# Patient Record
Sex: FEMALE | Race: WHITE | NOT HISPANIC OR LATINO | Employment: UNEMPLOYED | URBAN - METROPOLITAN AREA
[De-identification: names, ages, dates, MRNs, and addresses within clinical notes are randomized per-mention and may not be internally consistent; named-entity substitution may affect disease eponyms.]

---

## 2022-01-21 ENCOUNTER — APPOINTMENT (OUTPATIENT)
Dept: LAB | Facility: CLINIC | Age: 47
End: 2022-01-21
Payer: COMMERCIAL

## 2023-11-02 ENCOUNTER — HOSPITAL ENCOUNTER (EMERGENCY)
Facility: HOSPITAL | Age: 48
Discharge: HOME | End: 2023-11-02
Attending: EMERGENCY MEDICINE | Admitting: EMERGENCY MEDICINE
Payer: COMMERCIAL

## 2023-11-02 VITALS
TEMPERATURE: 97.2 F | DIASTOLIC BLOOD PRESSURE: 83 MMHG | RESPIRATION RATE: 22 BRPM | SYSTOLIC BLOOD PRESSURE: 135 MMHG | OXYGEN SATURATION: 93 % | HEART RATE: 77 BPM

## 2023-11-02 DIAGNOSIS — H69.90 EUSTACHIAN TUBE DYSFUNCTION: Primary | ICD-10-CM

## 2023-11-02 PROCEDURE — 99284 EMERGENCY DEPT VISIT MOD MDM: CPT | Performed by: EMERGENCY MEDICINE

## 2023-11-02 PROCEDURE — 99282 EMERGENCY DEPT VISIT SF MDM: CPT

## 2023-11-02 RX ORDER — KETOROLAC TROMETHAMINE 30 MG/ML
15 INJECTION, SOLUTION INTRAMUSCULAR; INTRAVENOUS ONCE
Status: DISCONTINUED | OUTPATIENT
Start: 2023-11-02 | End: 2023-11-02 | Stop reason: HOSPADM

## 2023-11-02 NOTE — DISCHARGE INSTRUCTIONS
You were seen in the ED for ear pain. Return to the ED for any worsening symptoms or new symptoms. Follow up with your primary care doctor as soon as possible. Take ibuprofen and tylenol for your pain.

## 2023-11-02 NOTE — ED PROVIDER NOTES
History  Chief Complaint   Patient presents with    Earache     Having right ear pain since last week. Having a hard time sleeping. Patient having teeth pulled for implants every two weeks. This 65-year-old female patient presenting with right ear pain onset a week ago. Patient states in the past couple days she has been having her teeth removed so that she could get an implant. Patient denies any fevers, difficulty hearing, shortness of breath or any other symptoms. Patient states pain only to her right ear. Is states she took ibuprofen without improvement of symptoms. None       History reviewed. No pertinent past medical history. History reviewed. No pertinent surgical history. History reviewed. No pertinent family history. I have reviewed and agree with the history as documented. E-Cigarette/Vaping     E-Cigarette/Vaping Substances     Social History     Tobacco Use    Smoking status: Every Day     Packs/day: 0.25     Types: Cigarettes    Smokeless tobacco: Never   Substance Use Topics    Alcohol use: Not Currently    Drug use: Not Currently        Review of Systems   HENT:  Positive for ear pain (Right ear pain). All other systems reviewed and are negative. Physical Exam  ED Triage Vitals [11/02/23 1457]   Temperature Pulse Respirations Blood Pressure SpO2   (!) 97.2 °F (36.2 °C) 77 22 135/83 93 %      Temp Source Heart Rate Source Patient Position - Orthostatic VS BP Location FiO2 (%)   Temporal Monitor Sitting Right arm --      Pain Score       8             Orthostatic Vital Signs  Vitals:    11/02/23 1457   BP: 135/83   Pulse: 77   Patient Position - Orthostatic VS: Sitting       Physical Exam  Vitals reviewed. Constitutional:       Appearance: Normal appearance. HENT:      Head: Normocephalic and atraumatic.       Right Ear: Tympanic membrane, ear canal and external ear normal.      Left Ear: Tympanic membrane, ear canal and external ear normal.      Nose: Nose normal. Mouth/Throat:      Mouth: Mucous membranes are moist.      Pharynx: Oropharynx is clear. Eyes:      Extraocular Movements: Extraocular movements intact. Conjunctiva/sclera: Conjunctivae normal.   Cardiovascular:      Rate and Rhythm: Normal rate and regular rhythm. Pulses: Normal pulses. Heart sounds: Normal heart sounds. Pulmonary:      Effort: Pulmonary effort is normal.      Breath sounds: Normal breath sounds. Abdominal:      General: Bowel sounds are normal.      Palpations: Abdomen is soft. Musculoskeletal:         General: Normal range of motion. Cervical back: Normal range of motion. Skin:     General: Skin is warm and dry. Neurological:      General: No focal deficit present. Mental Status: She is alert and oriented to person, place, and time. Mental status is at baseline. ED Medications  Medications   ketorolac (TORADOL) injection 15 mg (has no administration in time range)       Diagnostic Studies  Results Reviewed       None                   No orders to display         Procedures  Procedures      ED Course                                       Medical Decision Making  45-year-old female patient presenting with right ear pain onset a week. No other symptoms. No fevers. DDx includes otitis media versus otitis externa versus eustachian tube dysfunction. On exam, tympanic membranes within normal limits. No canal tenderness. No mastoid tenderness. Stable for discharge with follow-up with PCP. Return precautions given. Amount and/or Complexity of Data Reviewed  Discussion of management or test interpretation with external provider(s): Follow up with ENT    Risk  Prescription drug management.           Disposition  Final diagnoses:   Eustachian tube dysfunction     Time reflects when diagnosis was documented in both MDM as applicable and the Disposition within this note       Time User Action Codes Description Comment    11/2/2023  3:47 PM Charlene Spencer Julissa Add [H69.90] Eustachian tube dysfunction           ED Disposition       ED Disposition   Discharge    Condition   Stable    Date/Time   Thu Nov 2, 2023  3:47 PM    Comment   Swetha Solomon discharge to home/self care. Follow-up Information       Follow up With Specialties Details Why Contact Info Additional [address] [facility] Otolaryngology Schedule an appointment as soon as possible for a visit   [address] [facility] [id number]  [address] [place] [address], [address], [place], [address]            There are no discharge medications for this patient. No discharge procedures on file. PDMP Review       None             ED Provider  Attending physically available and evaluated Swetha Solomon. I managed the patient along with the ED Attending.     Electronically Signed by           Beverly Anthony MD  11/02/23 0459

## 2023-11-02 NOTE — Clinical Note
Lluvia Myers was seen and treated in our emergency department on 11/2/2023. Diagnosis: ear pain    Denise  . She may return on this date: 11/04/2023         If you have any questions or concerns, please don't hesitate to call.       Karin Garces MD    ______________________________           _______________          _______________  [facility] [place]                              Date                                Time

## 2023-11-02 NOTE — ED ATTENDING ATTESTATION
11/2/2023  IKris DO, saw and evaluated the patient. I have discussed the patient with the resident/non-physician practitioner and agree with the resident's/non-physician practitioner's findings, Plan of Care, and MDM as documented in the resident's/non-physician practitioner's note, except where noted. All available labs and Radiology studies were reviewed. I was present for key portions of any procedure(s) performed by the resident/non-physician practitioner and I was immediately available to provide assistance. At this point I agree with the current assessment done in the Emergency Department. I have conducted an independent evaluation of this patient a history and physical is as follows:Medical Decision Making  This is a 61-year-old female presents emergency department with noted symptoms of right ear pain that has been present for approximately 3 to 4 days duration. The patient denies any fevers or chills no nasal congestion, no difficulty breathing, no redness of the external ear canal, patient has been having significant teeth pulled secondary to dental work. Patient has no intraoral lesions at this point time no dental abscesses, on examination of the ear there is no evidence of fluid effusion, no erythema of the tympanic membrane the tympanic membrane's legs clean and clear, discussion with the patient at this point time diagnosis of eustachian tube dysfunction with noted follow-up with ear nose and throat with consult. Given strict instruction when to return back to the emergency department. Recommend NSAIDs for pain control. Risk  Prescription drug management.         All labs reviewed and interpreted by myself   All radiology studies independently viewed by me and interpreted by myself     No orders to display       Labs Reviewed - No data to display    Clinical Impression:    Final diagnoses:   Eustachian tube dysfunction         ED Course         Critical Care Time  Procedures

## 2023-11-06 ENCOUNTER — APPOINTMENT (EMERGENCY)
Dept: RADIOLOGY | Facility: HOSPITAL | Age: 48
End: 2023-11-06
Payer: COMMERCIAL

## 2023-11-06 ENCOUNTER — HOSPITAL ENCOUNTER (EMERGENCY)
Facility: HOSPITAL | Age: 48
Discharge: HOME | End: 2023-11-06
Attending: EMERGENCY MEDICINE
Payer: COMMERCIAL

## 2023-11-06 VITALS
HEART RATE: 82 BPM | TEMPERATURE: 97.3 F | OXYGEN SATURATION: 96 % | DIASTOLIC BLOOD PRESSURE: 75 MMHG | SYSTOLIC BLOOD PRESSURE: 157 MMHG | RESPIRATION RATE: 20 BRPM

## 2023-11-06 DIAGNOSIS — M25.561 RIGHT KNEE PAIN: Primary | ICD-10-CM

## 2023-11-06 PROCEDURE — 73564 X-RAY EXAM KNEE 4 OR MORE: CPT

## 2023-11-06 PROCEDURE — 99283 EMERGENCY DEPT VISIT LOW MDM: CPT

## 2023-11-06 PROCEDURE — 99284 EMERGENCY DEPT VISIT MOD MDM: CPT | Performed by: EMERGENCY MEDICINE

## 2023-11-06 NOTE — ED PROVIDER NOTES
Chief Complaint   Patient presents with    Knee Pain     Fell and twisted right leg 2 days ago. Pain started to kick in yesterday     History of Present Illness and Review of Systems   This is a 57 y.o. female with no significant PMH coming in today with complaint of knee pain. She reports this happened 2 days ago. Reports she had a twisting motion of her right knee. Reports she is still ambulating however has had some pain over the last day. Denies any falls or traumatic injuries. No history of orthopedic instrumentation. Denies any large joint swelling, no fevers chills overlying erythema or infectious symptoms. No tick bite exposures. She has no major medical problems, no history of immunosuppression or otherwise. Smokes tobacco however no other drug use. No other symptoms currently. - No language barrier. No other complaints for this encounter. Remainder of ROS Reviewed and Non-Pertinent    Past Medical, Past Surgical History:    has no past medical history on file. has no past surgical history on file. Allergies:   No Known Allergies    Social and Family History:     Social History     Substance and Sexual Activity   Alcohol Use Not Currently     Social History     Tobacco Use   Smoking Status Every Day    Packs/day: 0.25    Types: Cigarettes   Smokeless Tobacco Never     Social History     Substance and Sexual Activity   Drug Use Not Currently       Physical Examination     Vitals:    11/06/23 1730   BP: 157/75   BP Location: Left arm   Pulse: 82   Resp: 20   Temp: (!) 97.3 °F (36.3 °C)   TempSrc: Temporal   SpO2: 96%       Physical Exam  Vitals and nursing note reviewed. Constitutional:       General: She is not in acute distress. Appearance: She is well-developed. HENT:      Head: Normocephalic and atraumatic. Eyes:      Conjunctiva/sclera: Conjunctivae normal.   Cardiovascular:      Rate and Rhythm: Normal rate and regular rhythm.       Heart sounds: No murmur heard.  Pulmonary:      Effort: Pulmonary effort is normal. No respiratory distress. Breath sounds: Normal breath sounds. Abdominal:      Palpations: Abdomen is soft. Tenderness: There is no abdominal tenderness. There is no guarding or rebound. Musculoskeletal:         General: No swelling. Cervical back: Neck supple. Right knee: No swelling, deformity, effusion, erythema, ecchymosis, bony tenderness or crepitus. Normal range of motion. No tenderness. No LCL laxity, MCL laxity, ACL laxity or PCL laxity. Normal patellar mobility. Skin:     General: Skin is warm and dry. Capillary Refill: Capillary refill takes less than 2 seconds. Neurological:      General: No focal deficit present. Mental Status: She is alert. Cranial Nerves: No cranial nerve deficit. Motor: No weakness. Psychiatric:         Mood and Affect: Mood normal.           Procedures   Procedures      MDM:   Medical Decision Making  Bro Veras is a 57 y.o. who presents with complaints of knee pain    Vital signs are stable, physical exam shows a benign MSK examination, ambulates without difficulty, compartents are soft, n/v intact, no effusion, otherwise well appearing    Ddx: Likely ligamentous injury. Doubtful for fracture however will obtain plain films. Clinically no evidence of tendinous defect or Baker's cyst or otherwise    Plan: Workup will include plain films, work note. Will monitor closely and reassess. Reassessment/Disposition: Plain films negative. Will provide ACE wrap, ortho follow up, WBAT. Amount and/or Complexity of Data Reviewed  Radiology: ordered. - Reviewed relevant past office visits/hospitalizations/procedures  -Obtained pertinent history that influenced decision making from the pt    ED Course as of 11/07/23 0025   Mon Nov 06, 2023   6704 My plain film read: no acute osseus abnormality      Final Dispo   Final Diagnosis:  1.  Right knee pain      Time reflects when diagnosis was documented in both MDM as applicable and the Disposition within this note       Time User Action Codes Description Comment    11/6/2023  6:02 PM Ala Service Add [G87.454] Right knee pain           ED Disposition       ED Disposition   Discharge    Condition   Stable    Date/Time   Mon Nov 6, 2023  6:02 PM    Comment   Jw Crew discharge to home/self care. Follow-up Information       Follow up With Specialties Details Why Contact Info Additional Information     [address] [facility] Orthopedic Surgery   [address] [address]  [facility], [address] [place], [place], [place], [facility]  Use Entrance A           Medications - No data to display    Risk Stratification Tools                Orders Placed This Encounter   Procedures    XR knee 4+ views Right injury       Labs:   Labs Reviewed - No data to display    Imaging:     XR knee 4+ views Right injury   ED Interpretation by Pelon Botello MD (11/07 0025)   No acute ossues abnormality         All details of the evaluation and treatment plan were made clear and additionally all questions and concerns were addressed while under my care. Portions of the record may have been created with voice recognition software. Occasional wrong word or "sound a like" substitutions may have occurred due to the inherent limitations of voice recognition software. Read the chart carefully and recognize, using context, where substitutions have occurred. The attending physician physically available and evaluated the above patient alongside myself.       Pelon Botello MD  11/07/23 Segundo Dudley

## 2023-11-06 NOTE — ED ATTENDING ATTESTATION
11/6/2023  IKaylee MD, saw and evaluated the patient. I have discussed the patient with the resident/non-physician practitioner and agree with the resident's/non-physician practitioner's findings, Plan of Care, and MDM as documented in the resident's/non-physician practitioner's note, except where noted. All available labs and Radiology studies were reviewed. I was present for key portions of any procedure(s) performed by the resident/non-physician practitioner and I was immediately available to provide assistance. At this point I agree with the current assessment done in the Emergency Department. I have conducted an independent evaluation of this patient a history and physical is as follows:    ED Course     61-year-old female presenting to the emergency department for evaluation of lateral right knee pain. Patient with inversion injury. Patient has been ambulatory on the knee. On examination the knee is unremarkable in external appearance. Full range of motion. No warmth. No erythema. Mild tenderness to palpation over the lateral aspect of the knee. Knee is stable to anterior and posterior draw as well as varus and valgus stress. Plan is x-ray, Ace wrap, outpatient Ortho follow-up. XR knee 4+ views Right injury    (Results Pending)   I independently visualized the knee x-ray and interpreted it as negative for fracture.         Critical Care Time  Procedures

## 2023-11-06 NOTE — DISCHARGE INSTRUCTIONS
Your workup here was not concerning for anything dangerous. Therefore there is no need for you to stay at the hospital for further testing. We feel safe to send you home. You can use Motrin for management of your symptoms. You should follow up with an orthopedist to assess for resolution of your symptoms and to determine if there is any further evaluation that needs to be performed. Return to the emergency department if you have any symptoms of worsening pain or swelling    Thank you for choosing [address] [place] [address] for your care!

## 2023-11-06 NOTE — Clinical Note
Rigobertone Cushing was seen and treated in our emergency department on 11/6/2023. None    Diagnosis:     Shaina Wang  is off the rest of the shift today. She may return on this date: If you have any questions or concerns, please don't hesitate to call.       Monse Roberson MD    ______________________________           _______________          _______________  Hospital Representative                              Date                                Time

## 2025-02-21 ENCOUNTER — CLINICAL SUPPORT (OUTPATIENT)
Dept: URGENT CARE | Age: 50
End: 2025-02-21
Payer: COMMERCIAL

## 2025-02-21 ENCOUNTER — APPOINTMENT (OUTPATIENT)
Dept: URGENT CARE | Age: 50
End: 2025-02-21
Payer: COMMERCIAL

## 2025-02-21 DIAGNOSIS — Z00.00 ROUTINE GENERAL MEDICAL EXAMINATION AT A HEALTH CARE FACILITY: ICD-10-CM

## 2025-02-21 PROCEDURE — 36415 COLL VENOUS BLD VENIPUNCTURE: CPT

## 2025-02-21 PROCEDURE — 86480 TB TEST CELL IMMUN MEASURE: CPT

## 2025-02-23 LAB
GAMMA INTERFERON BACKGROUND BLD IA-ACNC: 0.02 IU/ML
M TB IFN-G BLD-IMP: POSITIVE
M TB IFN-G CD4+ BCKGRND COR BLD-ACNC: 1.11 IU/ML
M TB IFN-G CD4+ BCKGRND COR BLD-ACNC: 1.24 IU/ML
MITOGEN IGNF BCKGRD COR BLD-ACNC: 9.98 IU/ML

## 2025-02-27 ENCOUNTER — APPOINTMENT (OUTPATIENT)
Dept: RADIOLOGY | Age: 50
End: 2025-02-27

## 2025-02-27 ENCOUNTER — TRANSCRIBE ORDERS (OUTPATIENT)
Dept: ADMINISTRATIVE | Age: 50
End: 2025-02-27

## 2025-02-27 DIAGNOSIS — R76.11 POSITIVE SKIN TEST FOR TUBERCULOSIS: Primary | ICD-10-CM

## 2025-02-27 DIAGNOSIS — R76.11 POSITIVE SKIN TEST FOR TUBERCULOSIS: ICD-10-CM

## 2025-02-27 PROCEDURE — 71045 X-RAY EXAM CHEST 1 VIEW: CPT
